# Patient Record
(demographics unavailable — no encounter records)

---

## 2025-06-20 NOTE — ASSESSMENT
[FreeTextEntry1] : Presents for exacerbation of chronic bilateral advanced valgus knee arthritis.  She had no relief with the viscosupplementation last visit a few years ago and opted against any surgical intervention as I had recommended.  She is not interested in any surgery at this time.  Explained eventually she may need total knee arthroplasty if the symptoms worsen.  We discussed meloxicam 15 mg prescription for maintenance therapy as needed.  She did not have any relief with viscosupplementation so the neck step would be authorization for Zilretta injections bilaterally. The shoulder is more recent no injury and no recent sporting activities.  The presentation suggests impingement and bursitis exacerbation.  Her range of motion and strength are well-preserved.  Her x-rays show mild degenerative changes.  We discussed the implications.  A home exercise program is advised.  Will also attempt the meloxicam for this.  The patient's current medication management of their orthopedic diagnosis was reviewed today: There is a moderate risk of morbidity with further treatment, especially from use of prescription strength medications and possible side effects of these medications which include upset stomach with oral medications, skin reactions to topical medications and cardiac/renal/diabetes issues with long term use.

## 2025-06-20 NOTE — IMAGING
[Right] : right shoulder [There are no fractures, subluxations or dislocations. No significant abnormalities are seen] : There are no fractures, subluxations or dislocations. No significant abnormalities are seen [AC Joint Arthrosis] : AC Joint Arthrosis [Bilateral] : knee bilaterally [All Views] : anteroposterior, lateral, skyline, and anteroposterior standing [advanced tricompartmental OA with lateral compartment narrowing and valgus alignment] : advanced tricompartmental OA with lateral compartment narrowing and valgus alignment [de-identified] : Bilat Knee Exam:  Inspection: Varus deformity, mild effusion, no warmth, no ecchymosis Palpation: Lateral joint line tenderness to palpation, Negative Sissy Range of motion: 0-110 with associated crepitus Strength: 5/5 quadriceps and hamstring strength Special testing: Ligamentously stable Motor and sensory intact distally Gait: Non antalgic gait  Right Shoulder Exam Inspection: No swelling, no ecchymosis, no peter deformity Palpation: Tenderness to palpation over greater tuberosity Stability: No instability or tenderness over AC joint Range of Motion:  Passive FF: 180; ER: 90: IR: 70; ER at the side 50 Strength: 5/5 supraspinatus, infraspinatus and subscapularis; there is pain with strength testing Special testing: Positive Scales test, positive impingement sign; Speeds and yergason negative Neuro: Motor and sensory intact distally

## 2025-06-20 NOTE — HISTORY OF PRESENT ILLNESS
[de-identified] : 06/20/2025: Patient presents today for a new injury visit for the R shoulder/ BL knee pain. Patient states she has been having ongoing pain for many years. Notes pain is worse with ROM. Does not recall any specific injury. No treatment to date.